# Patient Record
Sex: FEMALE | Race: WHITE | NOT HISPANIC OR LATINO | URBAN - METROPOLITAN AREA
[De-identification: names, ages, dates, MRNs, and addresses within clinical notes are randomized per-mention and may not be internally consistent; named-entity substitution may affect disease eponyms.]

---

## 2022-05-03 ENCOUNTER — TRANSFERRED RECORDS (OUTPATIENT)
Dept: HEALTH INFORMATION MANAGEMENT | Facility: CLINIC | Age: 52
End: 2022-05-03

## 2022-05-03 ENCOUNTER — MEDICAL CORRESPONDENCE (OUTPATIENT)
Dept: HEALTH INFORMATION MANAGEMENT | Facility: CLINIC | Age: 52
End: 2022-05-03

## 2022-05-05 ENCOUNTER — TRANSCRIBE ORDERS (OUTPATIENT)
Dept: OTHER | Age: 52
End: 2022-05-05

## 2022-05-05 DIAGNOSIS — M25.559: ICD-10-CM

## 2022-05-05 DIAGNOSIS — M25.559 PAIN IN HIP JOINT: Primary | ICD-10-CM

## 2022-05-10 ENCOUNTER — TELEPHONE (OUTPATIENT)
Dept: ORTHOPEDICS | Facility: CLINIC | Age: 52
End: 2022-05-10

## 2022-05-10 NOTE — TELEPHONE ENCOUNTER
5/17 30 min VIRTUAL [facility]   5/20 10:30am Мария   5/23 8am Muñoz   5/24 30 min VIRTUAL [facility]   5/25 8am, 10a, 12:30 Мария Bradley ATC

## 2022-05-10 NOTE — TELEPHONE ENCOUNTER
"Reason for call:  Other   Patient called regarding (reason for call): appointment  Additional comments: Patient has a diagnosis of \"ischial bone lesion\" please review for appropriate time frame for ortho oncology.      Phone number to reach patient:  Home number on file [phone] (home)    Best Time:  asap    Can we leave a detailed message on this number?  YES    Travel screening: Not Applicable     Ehsan BETTENCOURT   Orthopedic [facility] Care Coordinator           "

## 2022-05-11 ENCOUNTER — TELEPHONE (OUTPATIENT)
Dept: ORTHOPEDICS | Facility: CLINIC | Age: 52
End: 2022-05-11

## 2022-05-12 NOTE — TELEPHONE ENCOUNTER
Action May 12, 2022 3:38 PM MT   Action Taken CSS sent a req for imgs to [facility] [phone] and [facility] [phone]     Action May 13, 2022 11:59 AM MT   Action Taken imgs recvd and resolved to PACS by another user.       DIAGNOSIS: ischial bone lesion   APPOINTMENT DATE: 05/24/2022   NOTES STATUS DETAILS   OFFICE NOTE from referring provider Scanned in Media Tab on 05/11/2022 05/03/2022, 04/22/2022- Racheal Cabral MD - [facility]   OFFICE NOTE from other specialist Care Everywhere Physical Therapy from 2012 to 2020.   MEDICATION LIST Care Everywhere    LABS     CBC/DIFF Care Everywhere Most recent: 03/12/2022   MRI PACS 04/22/2022 MR Lumbar Spine - [facility]  04/22/2022- MR RT Hip - [facility]   ULTRASOUND PACS 02/17/2020, 09/12/2018, 08/08/2017 - Pelvic Complete - [facility]   XRAYS (IMAGES & REPORTS) PACS 03/23/2022- Hip - [facility]  10/09/2019 - Lumbar Spine- [facility]  04/13/2017, 03/10/2014 - Pelvis w/ RT Hip - [facility]  04/13/2017 - Pelvis w/LT Hip- [facility]

## 2022-05-24 ENCOUNTER — PRE VISIT (OUTPATIENT)
Dept: ORTHOPEDICS | Facility: CLINIC | Age: 52
End: 2022-05-24

## 2022-05-24 ENCOUNTER — VIRTUAL VISIT (OUTPATIENT)
Dept: ORTHOPEDICS | Facility: CLINIC | Age: 52
End: 2022-05-24
Payer: COMMERCIAL

## 2022-05-24 DIAGNOSIS — D16.8 BENIGN NEOPLASM OF PELVIC BONE: Primary | ICD-10-CM

## 2022-05-24 PROCEDURE — 99202 OFFICE O/P NEW SF 15 MIN: CPT | Mod: TEL | Performed by: ORTHOPAEDIC SURGERY

## 2022-05-24 RX ORDER — VALACYCLOVIR HYDROCHLORIDE 500 MG/1
500 TABLET, FILM COATED ORAL
COMMUNITY
Start: 2022-03-14

## 2022-05-24 NOTE — NURSING NOTE
Chief Complaint   Patient presents with     Consult     Left ischial bone lesion referred by Dr. Racheal Cabral at [facility]        51 year old  1970            Pain Assessment  Patient Currently in Pain: Yes  0-10 Pain Scale: 4  Primary Pain Location: Buttocks (left buttock around ischial area)            [id number] - [place], [place] - [address]        No Known Allergies        Current Outpatient Medications   Medication     valACYclovir (VALTREX) 500 MG tablet     No current facility-administered medications for this visit.

## 2022-05-24 NOTE — LETTER
5/24/2022         RE: Diane Bay  [address] [address]        Dear Colleague,    Thank you for referring your patient, Diane Bay, to the [facility] [place]. Please see a copy of my visit note below.    Diane is a 51-year-old who is seen for evaluation of an incidental finding of a small lesion in the right ischium.  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  She cannot associate this with any particular event or injury.    I reviewed elements of her electronic health record that were available.  I also reviewed recent x-rays and MRI scan of the right hip.  On the x-rays I do not clearly see a lesion in the ischium.  The MRI scan shows a very small lesion in the ischium which is bright on T2 images but not visible on T1 images.  The contour of the T2 images 1 1 compares axial and coronal cuts do not show a shape or configuration that would be consistent with a neoplasm.    Impression: Incidental finding of a lesion in the right ischium could represent a contusion or some type of inflammation. Patient could have hamstring tendinitis.    Plan: 1.  I recommend she return to her primary care or primary orthopedic team.  2. Follow-up as needed.    Visit began at 1:05 PM.  Imaging was reviewed to 1:10 PM.  Phone portion of the visit was from 1:20 PM to 1:27 PM.  Total visit was 13 minutes.      Sincerely,        Onel Herrera MD

## 2022-05-24 NOTE — PATIENT INSTRUCTIONS
Impression: Incidental finding of a lesion in the right ischium could represent a contusion or some type of inflammation. Patient could have hamstring tendinitis.    Plan: 1.  I recommend she return to her primary care or primary orthopedic team.  2. Follow-up as needed.

## 2022-05-24 NOTE — PROGRESS NOTES
Diane is a 51-year-old who is seen for evaluation of an incidental finding of a small lesion in the right ischium.  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  She cannot associate this with any particular event or injury.    I reviewed elements of her electronic health record that were available.  I also reviewed recent x-rays and MRI scan of the right hip.  On the x-rays I do not clearly see a lesion in the ischium.  The MRI scan shows a very small lesion in the ischium which is bright on T2 images but not visible on T1 images.  The contour of the T2 images 1 1 compares axial and coronal cuts do not show a shape or configuration that would be consistent with a neoplasm.    Impression: Incidental finding of a lesion in the right ischium could represent a contusion or some type of inflammation. Patient could have hamstring tendinitis.    Plan: 1.  I recommend she return to her primary care or primary orthopedic team.  2. Follow-up as needed.    Visit began at 1:05 PM.  Imaging was reviewed to 1:10 PM.  Phone portion of the visit was from 1:20 PM to 1:27 PM.  Total visit was 13 minutes.